# Patient Record
Sex: MALE | Race: OTHER | HISPANIC OR LATINO | URBAN - METROPOLITAN AREA
[De-identification: names, ages, dates, MRNs, and addresses within clinical notes are randomized per-mention and may not be internally consistent; named-entity substitution may affect disease eponyms.]

---

## 2019-03-12 ENCOUNTER — OUTSIDE PLACE OF SERVICE (OUTPATIENT)
Dept: ADMINISTRATIVE | Facility: OTHER | Age: 49
End: 2019-03-12
Payer: MEDICAID

## 2019-03-12 PROCEDURE — 99232 SBSQ HOSP IP/OBS MODERATE 35: CPT | Mod: ,,, | Performed by: THORACIC SURGERY (CARDIOTHORACIC VASCULAR SURGERY)

## 2019-03-12 PROCEDURE — 99232 PR SUBSEQUENT HOSPITAL CARE,LEVL II: ICD-10-PCS | Mod: ,,, | Performed by: THORACIC SURGERY (CARDIOTHORACIC VASCULAR SURGERY)

## 2019-03-22 ENCOUNTER — TELEPHONE (OUTPATIENT)
Dept: VASCULAR SURGERY | Facility: CLINIC | Age: 49
End: 2019-03-22

## 2019-03-22 NOTE — TELEPHONE ENCOUNTER
----- Message from Kyung Miller sent at 3/22/2019 11:47 AM CDT -----  Contact: Jennie- relative  Type:  Sooner Apoointment Request    Caller is requesting a sooner appointment.  Caller declined first available appointment listed below.  Caller will not accept being placed on the waitlist and is requesting a message be sent to doctor.    Name of Caller:  Jennie- relative  When is the first available appointment?  Na due to medicaid  Symptoms:  Post op appointment  Best Call Back Number:  [phone]  Additional Information:  Patient had surgery at [facility]

## 2019-03-22 NOTE — TELEPHONE ENCOUNTER
----- Message from Maliha Mabry sent at 3/22/2019  9:15 AM CDT -----  Contact: Maia-Friend of Family--[phone]  Type: Needs Medical Advice    Who Called:  Maia  Best Call Back Number: [phone] (home)   Additional Information: Pt Is in need of a letter to be written for the patients wife an mom to be able to stay in the states; residence needs to be extended so they can stay here and take care of patient until he Is better the immigrations office needs this letter. Would like a call back Maia is the interpretor for the patient and family. Thanks.

## 2019-04-17 ENCOUNTER — OFFICE VISIT (OUTPATIENT)
Dept: VASCULAR SURGERY | Facility: CLINIC | Age: 49
End: 2019-04-17
Payer: MEDICAID

## 2019-04-17 VITALS
SYSTOLIC BLOOD PRESSURE: 145 MMHG | BODY MASS INDEX: 25.67 KG/M2 | HEART RATE: 72 BPM | WEIGHT: 150.38 LBS | DIASTOLIC BLOOD PRESSURE: 88 MMHG | HEIGHT: 64 IN

## 2019-04-17 DIAGNOSIS — Z95.1 S/P CABG (CORONARY ARTERY BYPASS GRAFT): ICD-10-CM

## 2019-04-17 DIAGNOSIS — I25.118 CORONARY ARTERY DISEASE OF NATIVE ARTERY OF NATIVE HEART WITH STABLE ANGINA PECTORIS: ICD-10-CM

## 2019-04-17 PROCEDURE — 99024 PR POST-OP FOLLOW-UP VISIT: ICD-10-PCS | Mod: ,,, | Performed by: THORACIC SURGERY (CARDIOTHORACIC VASCULAR SURGERY)

## 2019-04-17 PROCEDURE — 99213 OFFICE O/P EST LOW 20 MIN: CPT | Mod: PBBFAC,PO | Performed by: THORACIC SURGERY (CARDIOTHORACIC VASCULAR SURGERY)

## 2019-04-17 PROCEDURE — 99024 POSTOP FOLLOW-UP VISIT: CPT | Mod: ,,, | Performed by: THORACIC SURGERY (CARDIOTHORACIC VASCULAR SURGERY)

## 2019-04-17 PROCEDURE — 99999 PR PBB SHADOW E&M-EST. PATIENT-LVL III: CPT | Mod: PBBFAC,,, | Performed by: THORACIC SURGERY (CARDIOTHORACIC VASCULAR SURGERY)

## 2019-04-17 PROCEDURE — 99999 PR PBB SHADOW E&M-EST. PATIENT-LVL III: ICD-10-PCS | Mod: PBBFAC,,, | Performed by: THORACIC SURGERY (CARDIOTHORACIC VASCULAR SURGERY)

## 2019-04-17 RX ORDER — ATORVASTATIN CALCIUM 80 MG/1
TABLET, FILM COATED ORAL
COMMUNITY
Start: 2019-04-16

## 2019-04-17 RX ORDER — METOPROLOL TARTRATE 25 MG/1
TABLET, FILM COATED ORAL
COMMUNITY
Start: 2019-04-16

## 2019-04-17 RX ORDER — METFORMIN HYDROCHLORIDE 850 MG/1
850 TABLET ORAL 2 TIMES DAILY WITH MEALS
COMMUNITY

## 2019-04-17 RX ORDER — FERROUS SULFATE 325(65) MG
TABLET ORAL
COMMUNITY
Start: 2019-04-16

## 2019-04-17 RX ORDER — ASPIRIN 325 MG
TABLET, DELAYED RELEASE (ENTERIC COATED) ORAL
Refills: 1 | COMMUNITY
Start: 2019-03-21

## 2019-04-17 RX ORDER — NITROGLYCERIN 0.4 MG/1
TABLET SUBLINGUAL
Refills: 0 | COMMUNITY
Start: 2019-03-21

## 2019-04-18 NOTE — PROGRESS NOTES
OFFICE NOTE    This is a 48-year-old gentleman status post emergency coronary artery bypass   grafting.  He has done well.  He comes back to the office today in followup.  He   has no major complaints.  Medicines are noted.  He has taken daily aspirin.  He   is also on metformin for diabetes.  He does take Lopressor.    PHYSICAL EXAMINATION:  VITAL SIGNS:  Stable.  Surgical wounds are clean and dry.  There is no evidence   of infection.  CHEST:  Clear.  HEART:  Regular rate and rhythm.  ABDOMEN:  Benign.     His chest tube stitches were removed.  He can resume his usual activities   without restriction.  I would anticipate a good long-term outlook.  He can see   me on an as needed basis.      PIERRE  dd: 04/17/2019 15:41:44 (CDT)  td: 04/18/2019 02:08:24 (CDT)  Doc ID   #[id number]  Job ID #[id number]    CC: